# Patient Record
(demographics unavailable — no encounter records)

---

## 2025-07-24 NOTE — HISTORY OF PRESENT ILLNESS
[de-identified] : 49F who presents with chronic hearing issues. She feels like she cannot clearly hear what others are saying. No otalgia, otorrhea, vertigo, tinnitus, acute hearing changes. No hx of ear surgery nor ear infections. Her ears also pop randomly and she feels like she is on an airplane sometimes. If she pops her ears, they become less muffled. She has a chronic history of seasonal allergies to pollen resulting in nasal congestion and runny nose.

## 2025-07-24 NOTE — PHYSICAL EXAM
[TextEntry] : General: AAO, no significant distress Psychiatric: affect pleasant and within normal limits Eyes: relatively symmetric, no obvious nystagmus Skin: no significant lesions on face Nose: no significant lesions; patent. Oral Cavity & Oropharynx: no significant deformity or lesions Neck/Lymphatics: no significant masses or abnormal cervical nodes palpated Respiratory: breathing comfortably; no significant distress Neurologic: cranial nerves II-XII grossly intact; EOMI Facial function: symmetric   Ear examination performed under binocular otologic microscope: Left Ear External: Pinna and periauricular area is normal. Canal: Ear canal skin is not inflamed or edematous. Left cerumen impaction cleaned under microscopy with instrumentation Tympanic Membrane: Intact and in good position.   Right Ear External: Pinna and periauricular area is normal. Canal: Ear canal skin is not inflamed or edematous. Right cerumen impaction cleaned under microscopy with instrumentation Tympanic Membrane: Intact and in good position.   Procedure: Left cerumen removal Pre-operative Diagnosis: Left cerumen impaction Post-operative Diagnosis: Left cerumen impaction Anesthesia: None Procedure Details: The patient was placed in the supine position. The left ear canal was determined to be impacted with cerumen. A curette and/or suction was used to remove the cerumen impaction under microscopy. Condition: Stable. Patient tolerated procedure well. Complications: None.   Procedure: Right cerumen removal Pre-operative Diagnosis: Right cerumen impaction Post-operative Diagnosis: Right cerumen impaction Anesthesia: None Procedure Details: The patient was placed in the supine position. The right ear canal was determined to be impacted with cerumen. A curette and/or suction was used to remove the cerumen impaction under microscopy. Condition: Stable. Patient tolerated procedure well. Complications: None.   Nasal Endoscopy:   Pre-operative Diagnosis: allergic rhinitis Post-operative Diagnosis: same Anesthesia: None Procedure: Bilateral nasal endoscopy Procedure Details:   The patient was placed in the seated position sitting straight up. The telescope was passed along the left nasal floor to the nasopharynx. It was then passed into the region of the middle meatus, middle turbinate, and the sphenoethmoid region. An identical procedure was performed on the right side.   Findings: Interior nasal cavity: normal bilaterally Middle and superior meatus: normal bilaterally Sphenoethmoidal recess: normal bilaterally Mucosa: normal bilaterally Nasal septum: normal Discharge: none bilaterally Turbinates: ENLARGED AND BOGGY INFERIOR TURBINATES BILATERALLY Adenoid: normal bilaterally Posterior choanae: normal bilaterally Eustachian tubes: normal bilaterally Mucous stranding: COPIOUS SECRETIONS AND STRANDING BILATERALLY Lesions: Not present     Comments:   Condition: Stable. Patient tolerated procedure well.

## 2025-07-24 NOTE — ASSESSMENT
[FreeTextEntry1] : CAPD - 1 chronic illness - audiogram personally reviewed and interpreted - normal hearing bilaterally - recommend increased focus when speaking with others - f/u prn  Bilateral Cerumen Impaction - 1 chronic illness - Bilateral cerumen impaction removed under microscopy with instrumentation  Allergic Rhinitis / Bilateral ETD - 1 chronic illness - Azelastine 1 spray to each nostril bid - discussed the risks of Azelastine spray which include but are not limited to: epistaxis, dry nose, pharyngitis, nasal irritation/pain, headache, sore throat, congestion, sinusitis, rhinorrhea - f/u if not improved in 1 month

## 2025-07-24 NOTE — DATA REVIEWED
[de-identified] : Audiogram personally reviewed and interpreted and my findings are as follows (7/24/25): Right: SRT 15dB; %; Type A tymp; normal Left: SRT 15dB; %; Type Ad ymp; normal